# Patient Record
Sex: MALE | Race: WHITE | NOT HISPANIC OR LATINO | URBAN - NONMETROPOLITAN AREA
[De-identification: names, ages, dates, MRNs, and addresses within clinical notes are randomized per-mention and may not be internally consistent; named-entity substitution may affect disease eponyms.]

---

## 2024-09-09 ENCOUNTER — OFFICE VISIT (OUTPATIENT)
Dept: URBAN - NONMETROPOLITAN AREA CLINIC 13 | Facility: CLINIC | Age: 64
End: 2024-09-09

## 2024-09-09 NOTE — PHYSICAL EXAM MUSCULOSKELETAL:
normal gait and station  Purse String (Simple) Text: Given the location of the defect and the characteristics of the surrounding skin a purse string closure was deemed most appropriate.  Undermining was performed circumferentially around the surgical defect.  A purse string suture was then placed and tightened.

## 2024-09-09 NOTE — HPI-TODAY'S VISIT:
9/9/2024 Mr. Sarmad Gibson is a 64 year old male here for GERD. He was last seen in 2014 for hospital f/u of melena and elevated liver enyzmes. He has a hx of alcohol abuse. He was scheduled for EGD/Colon. he did not follow up